# Patient Record
Sex: FEMALE | Race: WHITE
[De-identification: names, ages, dates, MRNs, and addresses within clinical notes are randomized per-mention and may not be internally consistent; named-entity substitution may affect disease eponyms.]

---

## 2022-12-20 ENCOUNTER — HOSPITAL ENCOUNTER (EMERGENCY)
Dept: HOSPITAL 4 - SED | Age: 52
LOS: 1 days | Discharge: HOME | End: 2022-12-21
Payer: COMMERCIAL

## 2022-12-20 VITALS — HEIGHT: 64 IN | BODY MASS INDEX: 24.75 KG/M2 | WEIGHT: 145 LBS

## 2022-12-20 VITALS — SYSTOLIC BLOOD PRESSURE: 149 MMHG

## 2022-12-20 DIAGNOSIS — R30.0: ICD-10-CM

## 2022-12-20 DIAGNOSIS — Z88.0: ICD-10-CM

## 2022-12-20 DIAGNOSIS — N39.0: Primary | ICD-10-CM

## 2022-12-20 DIAGNOSIS — Z79.899: ICD-10-CM

## 2022-12-20 DIAGNOSIS — R10.30: ICD-10-CM

## 2022-12-20 NOTE — NUR
Pt brought by self, A&Ox4, pt presents to ER with lower abd pain and pain, 
burning with urination, skin pink and warm, cap refill <3, VSS

## 2022-12-20 NOTE — NUR
-------------------------------------------------------------------------------

           *** Note undone in [facility] - 12/20/22 at 2201 by [email or web address] ***            

-------------------------------------------------------------------------------

Dr aHwkins evaluating patient at bedside

## 2022-12-21 VITALS — SYSTOLIC BLOOD PRESSURE: 101 MMHG

## 2022-12-21 LAB
APPEARANCE UR: (no result)
BACTERIA URNS QL MICRO: (no result) /HPF
BILIRUB UR QL STRIP: NEGATIVE
COLOR UR: YELLOW
GLUCOSE UR STRIP-MCNC: NEGATIVE MG/DL
HGB UR QL STRIP: (no result)
KETONES UR STRIP-MCNC: NEGATIVE MG/DL
LEUKOCYTE ESTERASE UR QL STRIP: (no result)
NITRITE UR QL STRIP: NEGATIVE
PH UR STRIP: 6 [PH] (ref 5–8)
PROT UR QL STRIP: (no result)
SP GR UR STRIP: 1 (ref 1–1.03)
UROBILINOGEN UR STRIP-MCNC: 0.2 MG/DL (ref 0.2–1)
WBC #/AREA URNS HPF: >100 /HPF (ref 0–3)

## 2022-12-21 NOTE — NUR
Patient given written and verbal discharge instructions and verbalizes 
understanding.  ER MD discussed with patient the results and treatment 
provided. Patient in stable condition. ID arm band removed. IV catheter removed 
intact and dressing applied, no active bleeding.

Rx of CIPRO given. Patient educated on pain management and to follow up with 
PMD. Pain Scale .

Opportunity for questions provided and answered. Medication side effect fact 
sheet provided.